# Patient Record
Sex: FEMALE | Race: WHITE | NOT HISPANIC OR LATINO | Employment: FULL TIME | URBAN - METROPOLITAN AREA
[De-identification: names, ages, dates, MRNs, and addresses within clinical notes are randomized per-mention and may not be internally consistent; named-entity substitution may affect disease eponyms.]

---

## 2017-02-01 ENCOUNTER — OFFICE VISIT (OUTPATIENT)
Dept: MEDICAL GROUP | Facility: MEDICAL CENTER | Age: 22
End: 2017-02-01
Payer: COMMERCIAL

## 2017-02-01 VITALS
BODY MASS INDEX: 26.13 KG/M2 | HEART RATE: 87 BPM | TEMPERATURE: 99.1 F | RESPIRATION RATE: 14 BRPM | SYSTOLIC BLOOD PRESSURE: 110 MMHG | DIASTOLIC BLOOD PRESSURE: 82 MMHG | WEIGHT: 176.4 LBS | OXYGEN SATURATION: 98 % | HEIGHT: 69 IN

## 2017-02-01 DIAGNOSIS — M79.2 PERIPHERAL NEUROPATHIC PAIN: ICD-10-CM

## 2017-02-01 DIAGNOSIS — T33.90XS FROSTBITE, SEQUELA: ICD-10-CM

## 2017-02-01 PROCEDURE — 99203 OFFICE O/P NEW LOW 30 MIN: CPT | Performed by: PHYSICIAN ASSISTANT

## 2017-02-01 RX ORDER — GABAPENTIN 300 MG/1
300 CAPSULE ORAL 3 TIMES DAILY
Qty: 90 CAP | Refills: 0 | Status: SHIPPED | OUTPATIENT
Start: 2017-02-01 | End: 2018-04-30

## 2017-02-01 ASSESSMENT — PAIN SCALES - GENERAL: PAINLEVEL: 6=MODERATE PAIN

## 2017-02-01 ASSESSMENT — PATIENT HEALTH QUESTIONNAIRE - PHQ9: CLINICAL INTERPRETATION OF PHQ2 SCORE: 0

## 2017-02-01 NOTE — MR AVS SNAPSHOT
"        Jimmy Gomez   2017 1:20 PM   Office Visit   MRN: [id number]    Department:  [facility]   Dept Phone:  [phone]    Description:  Female : 1995   Provider:  She Dyer PA-C           Reason for Visit     Establish Care Quijano bite (possible nerve damage - both feet)      Allergies as of 2017     Allergen Noted Reactions    Sulfa Drugs 2017   Swelling    Inflammation       You were diagnosed with     Frostbite, initial encounter   [835806]         Vital Signs     Blood Pressure Pulse Temperature Respirations Height Weight    110/82 mmHg 87 37.3 °C (99.1 °F) 14 1.753 m (5' 9.02\") 80.015 kg (176 lb 6.4 oz)    Body Mass Index Oxygen Saturation Last Menstrual Period Breastfeeding? Smoking Status       26.04 kg/m2 98% 2017 No Never Smoker        Basic Information     Date Of Birth Sex Race Ethnicity Preferred Language    1995 Female White Non- English      Your appointments     Mar 02, 2017  9:20 AM   Established Patient with She Dyer PA-C   [facility] (--)    [address]  [address]  [facility] [address]   [phone]           You will be receiving a confirmation call a few days before your appointment from our automated call confirmation system.              Health Maintenance     Patient has no pending health maintenance at this time      Current Immunizations     No immunizations on file.      Below and/or attached are the medications your provider expects you to take. Review all of your home medications and newly ordered medications with your provider and/or pharmacist. Follow medication instructions as directed by your provider and/or pharmacist. Please keep your medication list with you and share with your provider. Update the information when medications are discontinued, doses are changed, or new medications (including over-the-counter products) are added; and carry medication information at all times in the event of " emergency situations     Allergies:  SULFA DRUGS - Swelling               Medications  Valid as of: February 01, 2017 -  1:49 PM    Generic Name Brand Name Tablet Size Instructions for use    Gabapentin (Cap) NEURONTIN 300 MG Take 1 Cap by mouth 3 times a day.        .                 Medicines prescribed today were sent to:     None      Medication refill instructions:       If your prescription bottle indicates you have medication refills left, it is not necessary to call your provider’s office. Please contact your pharmacy and they will refill your medication.    If your prescription bottle indicates you do not have any refills left, you may request refills at any time through one of the following ways: The online ASSURED INFORMATION SECURITY system (except Urgent Care), by calling your provider’s office, or by asking your pharmacy to contact your provider’s office with a refill request. Medication refills are processed only during regular business hours and may not be available until the next business day. Your provider may request additional information or to have a follow-up visit with you prior to refilling your medication.   *Please Note: Medication refills are assigned a new Rx number when refilled electronically. Your pharmacy may indicate that no refills were authorized even though a new prescription for the same medication is available at the pharmacy. Please request the medicine by name with the pharmacy before contacting your provider for a refill.        Referral     A referral request has been sent to our patient care coordination department. Please allow 3-5 business days for us to process this request and contact you either by phone or mail. If you do not hear from us by the 5th business day, please call us at [phone].           ASSURED INFORMATION SECURITY Access Code: Activation code not generated  Current ASSURED INFORMATION SECURITY Status: Active

## 2017-02-01 NOTE — PROGRESS NOTES
"Subjective:      Jimmy Dyer is a 21 y.o. female who presents with Establish [facility]          Chief Complaint   Patient presents with   • Establish Care     Quijano bite (possible nerve damage - both feet)       HPIPatient presents to establish care to follow up on bilateral foot frostbite. 2 weeks ago, Thursday, shoveling snow for 2 hours. Came in and feet were numb and discolored. For 2 days the numbness continued but the color got better. On Sunday pain became severe and she went to the ER in [facility] and was dx with frostbite and put on morphine. Since then color is ok but she still has 6-9/10 pain. When she tried to go back to work 2 days ago she had to leave after 3 hours of standing because of the pain. Has not had this problem before.    ROSno fever/chills. No weight change. No neck or back pain. No skin rash or lesion. No hx of Raynauds or other circulatory problems.    PMHX: negative for heart or lung disease. Negative for diabetes or immune disorder  Meds: on no regular daily meds. Put on norco for frostbite pain, not taking now  Allergy to sulfa  Non-smoker. 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 No     Physical Exam   Constitutional: She is oriented to person, place, and time. She appears well-developed and well-nourished. No distress.   Musculoskeletal:   bilat feet normal appearance, normal color. Cold. Normal cap refill. Normal sensation. Dorsal pedis and post tibialis pulses appreciated bilat    Neurological: She is alert and oriented to person, place, and time.   Skin: Skin is warm and dry. No rash noted. She is not diaphoretic. No pallor.   Psychiatric: She has a normal mood and affect. Her behavior is normal. Judgment and thought content normal. "   Vitals reviewed.              Assessment/Plan:     1. Frostbite, sequela with peripheral neuropathic pain    - gabapentin (NEURONTIN) 300 MG Cap; Take 1 Cap by mouth 3 times a day.  Dispense: 90 Cap; Refill: 0 - start with one a day then increase to 1 tab BID after 3-4 days. Can take up to 1 tab TID after a week. Discussed potential side effects. Ask patient to call with any concerns or adverse reactions.    - REFERRAL TO NEUROLOGY

## 2017-04-12 ENCOUNTER — OFFICE VISIT (OUTPATIENT)
Dept: NEUROLOGY | Facility: MEDICAL CENTER | Age: 22
End: 2017-04-12
Payer: COMMERCIAL

## 2017-04-12 VITALS
SYSTOLIC BLOOD PRESSURE: 114 MMHG | DIASTOLIC BLOOD PRESSURE: 76 MMHG | BODY MASS INDEX: 26.36 KG/M2 | OXYGEN SATURATION: 98 % | HEIGHT: 69 IN | TEMPERATURE: 98.2 F | RESPIRATION RATE: 16 BRPM | WEIGHT: 178 LBS | HEART RATE: 87 BPM

## 2017-04-12 DIAGNOSIS — R55 SYNCOPE AND COLLAPSE: ICD-10-CM

## 2017-04-12 PROCEDURE — 99204 OFFICE O/P NEW MOD 45 MIN: CPT

## 2017-04-12 NOTE — MR AVS SNAPSHOT
"        Jimmy Gomez   2017 9:40 AM   Office Visit   MRN: [id number]    Department[facility]  [facility]   Dept Phone:  [phone]    Description:  Female : 1995   Provider:  Aleks Woodruff M.D.           Reason for Visit     New Patient syncope and collapse      Allergies as of 2017     Allergen Noted Reactions    Sulfa Drugs 2017   Swelling    Inflammation       You were diagnosed with     Syncope and collapse   [780.2.ICD-9-CM]         Vital Signs     Blood Pressure Pulse Temperature Respirations Height Weight    114/76 mmHg 87 36.8 °C (98.2 °F) 16 1.753 m (5' 9.02\") 80.74 kg (178 lb)    Body Mass Index Oxygen Saturation Smoking Status             26.27 kg/m2 98% Never Smoker          Basic Information     Date Of Birth Sex Race Ethnicity Preferred Language    1995 Female White Non- English      Health Maintenance        Date Due Completion Dates    IMM HEP B VACCINE (1 of 3 - Primary Series) 1996 ---    IMM HEP A VACCINE (1 of 2 - Standard Series) 1997 ---    IMM HPV VACCINE (1 of 3 - Female 3 Dose Series) 2007 ---    IMM VARICELLA (CHICKENPOX) VACCINE (1 of 2 - 2 Dose Adolescent Series) 2009 ---    IMM MENINGOCOCCAL VACCINE (MCV4) (1 of 1) 2012 ---    IMM DTaP/Tdap/Td Vaccine (1 - Tdap) 2015 ---            Current Immunizations     No immunizations on file.      Below and/or attached are the medications your provider expects you to take. Review all of your home medications and newly ordered medications with your provider and/or pharmacist. Follow medication instructions as directed by your provider and/or pharmacist. Please keep your medication list with you and share with your provider. Update the information when medications are discontinued, doses are changed, or new medications (including over-the-counter products) are added; and carry medication information at all times in the event of emergency situations     Allergies:  SULFA DRUGS - " Swelling               Medications  Valid as of: April 12, 2017 -  9:55 AM    Generic Name Brand Name Tablet Size Instructions for use    Gabapentin (Cap) NEURONTIN 300 MG Take 1 Cap by mouth 3 times a day.        .                 Medicines prescribed today were sent to:     [id number] - [place], [place] - [address]    [address] [place] [address]    Phone: [phone] Fax: [phone]    Open 24 Hours?: No      Medication refill instructions:       If your prescription bottle indicates you have medication refills left, it is not necessary to call your provider’s office. Please contact your pharmacy and they will refill your medication.    If your prescription bottle indicates you do not have any refills left, you may request refills at any time through one of the following ways: The online Belly Ballot system (except Urgent Care), by calling your provider’s office, or by asking your pharmacy to contact your provider’s office with a refill request. Medication refills are processed only during regular business hours and may not be available until the next business day. Your provider may request additional information or to have a follow-up visit with you prior to refilling your medication.   *Please Note: Medication refills are assigned a new Rx number when refilled electronically. Your pharmacy may indicate that no refills were authorized even though a new prescription for the same medication is available at the pharmacy. Please request the medicine by name with the pharmacy before contacting your provider for a refill.        Your To Do List     Future Labs/Procedures Complete By Expires    CBC WITH DIFFERENTIAL  As directed 4/12/2018    COMP METABOLIC PANEL  As directed 4/12/2018      Referral     A referral request has been sent to our patient care coordination department. Please allow 3-5 business days for us to process this request and contact you either by phone or mail. If you do not hear  from us by the 5th business day, please call us at [phone].           Fileblaze Access Code: Activation code not generated  Current Fileblaze Status: Active

## 2017-04-12 NOTE — Clinical Note
April 12, 2017       Patient: Jimmy Dyer   YOB: 1995   Date of Visit: 4/12/2017         To Whom It May Concern:    It is my medical opinion that Jimmy Dyer duty restriction- off work):[id number]}. For 2 months    If you have any questions or concerns, please don't hesitate to call [phone]          Sincerely,          Aleks Woodruff M.D.  Electronically Signed

## 2017-04-12 NOTE — PROGRESS NOTES
Neurology Consult Note  4/12/2017      Referring MD:  Dr. She Dyer    Patient ID:  Name:             Jimmy Dyer     YOB: 1995  Age:                 21 y.o.  female   MRN:               [id number]                                              Reason for Consult:      Syncope.    History of Present Illness:    This 21-year-old young woman who has been fairly athletic has had a couple of episodes of syncope or near syncope in the last which resulted in prolonged unresponsiveness for a couple of minutes. She notes that she begins to feel warm and then has some visual changes and has had one or 2 episodes of syncope and at the last event apparently had leaned against the wall and was unconscious until she was laid down flat on the floor. She apparently had a cardiogram done in the emergency several which was normal but was told that she had some eye fluttering and twitching movements until she awakened. She has been an active skier and has done 10 mile run at least one point in her career. She has no other health problems she is aware of and is on no medications. 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 Is no family history of syncope or family history of seizures.    Review of Systems: Essentially negative.  Constitutional: Denies fevers, Denies weight changes  Eyes: Denies changes in vision, no eye pain  Ears/Nose/Throat/Mouth: Denies nasal congestion or sore throat   Cardiovascular: Denies chest pain, Denies palpitations   Respiratory: Denies shortness of breath , Denies cough  Gastrointestinal/Hepatic: Denies abdominal pain, nausea, vomiting, diarrhea, constipation or GI bleeding   Genitourinary: Denies dysuria or frequency  Musculoskeletal/Rheum: Denies  joint pain and swelling, No edema  Skin: Denies  rash  Neurological: Denies headache, confusion, memory loss or focal weakness/parasthesias  Psychiatric: denies mood disorder   Endocrine: Tiffanie thyroid problems  Heme/Oncology/Lymph Nodes: Denies enlarged lymph nodes, denies brusing or known bleeding disorder  All other systems were reviewed and are negative (AMA/CMS criteria)                Past Medical History:     Past Medical History   Diagnosis Date   • Anxiety    • Depression        Problems addressed this visit:    Problem List Items Addressed This Visit     None      Visit Diagnoses     Syncope and collapse         Relevant Orders     REFERRAL TO NEURODIAGNOSTICS (EEG,EP,EMG/NCS/DBS) Modality Requested: EEG     REFERRAL TO CARDIOLOGY     COMP METABOLIC PANEL     CBC WITH DIFFERENTIAL     TSH+FREE T4     POCT A1C           Past Surgical History:   No past surgical history on file.      Current Outpatient Medications:  Current Outpatient Prescriptions   Medication   • gabapentin (NEURONTIN) 300 MG Cap     No current facility-administered medications for this visit.       Medication Allergy:  Allergies   Allergen Reactions   • Sulfa Drugs Swelling     Inflammation        Family History:  Family History   Problem Relation Age of Onset   • No Known Problems Mother    • No Known Problems Father    • No Known Problems Brother        Social History:  Social History     Social History   • Marital Status: Single     Spouse Name: N/A   • Number of Children: N/A   • Years of Education: N/A     Occupational History   • Not on file.     Social History Main Topics   • Smoking status: Never Smoker    • Smokeless tobacco: Never Used   • Alcohol Use: No   • Drug Use: No   • Sexual Activity:     Partners: Male     Birth Control/ Protection: Condom     Other Topics Concern   • Not on file     Social History Narrative   • No narrative on file       Physical Exam: He is a well-developed well-nourished obviously athletic young woman. Visual fields are full and extraocular  "movements are intact and the fundi are normal. Her heart is in regular sinus rhythm. Strengthening of the lower extremities and normal deep tendon reflexes are symmetric and plantar responses are downgoing. Coordination is intact to finger-nose heel-to-shin testing and Romberg. There is no sensory deficit to pinprick or light touch. No bruits are heard.  Vitals:   Filed Vitals:    04/12/17 0922   BP: 114/76   Pulse: 87   Temp: 36.8 °C (98.2 °F)   Resp: 16   Height: 1.753 m (5' 9.02\")   Weight: 80.74 kg (178 lb)   SpO2: 98%     General Appearance:   Weight/BMI: Body mass index is 26.27 kg/(m^2).    Neurologic Exam:   AOx3: Normal  Recent & remote memory intact: Yes  Attentive with normal coordination: Yes  Normal spontaneous speech pattern: Yes  Age appropriate fund of knowledge: Yes  Cranial nerve II intact: Normal  Cranial nerve III, IV & VI intact: Normal  Cranial nerve V intact: Normal  Cranial nerve VIII intact: Normal  Cranial nerve IX intact: Normal  Cranial nerve XI intact: Normal  Cranial nerve XII intact: Normal  No sensory deficits: Normal  DTRs intact & symmetrical: Normal   No dysdiadochokinesia or dysmetria: Normal    Eyes:  Normal optic discs: Yes  Visual field: Normal  Pupillary responses: Normal  Extraocular movement: Normal    Cardiovascular:  Normal carotid pulses bilaterally: Yes  RRR with no MRGs: Yes  No peripheral edema, pulses intact: Yes    Musculoskeletal:  Normal gait and station: Yes  Normal muscle strength: Yes  Normal muscle tone, no atrophy: Yes  No abnormal movements: Yes    Plan:   I suspect this is simply vasovagal syncope but since she did have a prolonged period of unconsciousness and EEG will be arranged. In addition I'm arranging for routine lab work and a cardiology consultation and advised her not to work since she works as a  for 2 months. Follow-up visit will be scheduled after that.    Total length of time of this visit was 40 minutes of which greater than 50% " was spent counseling the patient/family and coordinating care.    Thank you for allowing me to participate in his care.    Sincerely yours,        Aleks Woodruff MD, PhD

## 2017-06-14 ENCOUNTER — APPOINTMENT (OUTPATIENT)
Dept: NEUROLOGY | Facility: MEDICAL CENTER | Age: 22
End: 2017-06-14
Payer: COMMERCIAL

## 2017-06-21 ENCOUNTER — TELEPHONE (OUTPATIENT)
Dept: MEDICAL GROUP | Facility: MEDICAL CENTER | Age: 22
End: 2017-06-21

## 2017-06-21 DIAGNOSIS — R10.32 LLQ PAIN: ICD-10-CM

## 2017-06-21 NOTE — TELEPHONE ENCOUNTER
1. Caller Name: Laela Wilson                                         Call Back Number: [phone] (home)       Patient approves a detailed voicemail message: yes    2. SPECIFIC Action To Be Taken: Orders pending, please sign.    3. Diagnosis/Clinical Reason for Request: Severe abdominal Pain    4. Specialty & Provider Name/Lab/Imaging Location: Ultrasound    5. Is appointment scheduled for requested order/referral: no    Patient informed they will receive a return phone call from the office ONLY if there are any questions before processing their request. Advised to call back if they haven't received a call from the referral department in 5 days.      PATIENT STATES SHE WAS SEEN IN [place] ER. PATIENT STATES SHE WAS SEEN FOR SEVERE ABDOMINAL PAIN/LEFT OVARY PAIN. ER TOLD PATIENT THERES A POSSIBLE CYST AND HER OVARIES MAY BE TURNING. PATIENT STATES SHE IS IN EXCRUCIATING PAIN. THEY GAVE HER NORCO AND STILL ISN'T TOUCHING THE PAIN PLEASE ADVISE

## 2017-06-22 ENCOUNTER — HOSPITAL ENCOUNTER (EMERGENCY)
Facility: MEDICAL CENTER | Age: 22
End: 2017-06-23
Attending: EMERGENCY MEDICINE
Payer: COMMERCIAL

## 2017-06-22 ENCOUNTER — HOSPITAL ENCOUNTER (OUTPATIENT)
Dept: RADIOLOGY | Facility: MEDICAL CENTER | Age: 22
End: 2017-06-22
Attending: PHYSICIAN ASSISTANT
Payer: COMMERCIAL

## 2017-06-22 VITALS
DIASTOLIC BLOOD PRESSURE: 80 MMHG | HEIGHT: 69 IN | HEART RATE: 77 BPM | WEIGHT: 177.25 LBS | RESPIRATION RATE: 16 BRPM | OXYGEN SATURATION: 97 % | TEMPERATURE: 98.1 F | BODY MASS INDEX: 26.25 KG/M2 | SYSTOLIC BLOOD PRESSURE: 124 MMHG

## 2017-06-22 DIAGNOSIS — N83.209 HEMORRHAGIC OVARIAN CYST: ICD-10-CM

## 2017-06-22 DIAGNOSIS — R10.32 LLQ PAIN: ICD-10-CM

## 2017-06-22 DIAGNOSIS — R10.32 LEFT LOWER QUADRANT PAIN: ICD-10-CM

## 2017-06-22 LAB
ALBUMIN SERPL BCP-MCNC: 3.9 G/DL (ref 3.2–4.9)
ALBUMIN/GLOB SERPL: 1.5 G/DL
ALP SERPL-CCNC: 40 U/L (ref 30–99)
ALT SERPL-CCNC: 7 U/L (ref 2–50)
ANION GAP SERPL CALC-SCNC: 6 MMOL/L (ref 0–11.9)
AST SERPL-CCNC: 14 U/L (ref 12–45)
BASOPHILS # BLD AUTO: 0.5 % (ref 0–1.8)
BASOPHILS # BLD: 0.02 K/UL (ref 0–0.12)
BILIRUB SERPL-MCNC: 0.4 MG/DL (ref 0.1–1.5)
BUN SERPL-MCNC: 5 MG/DL (ref 8–22)
CALCIUM SERPL-MCNC: 9.1 MG/DL (ref 8.5–10.5)
CHLORIDE SERPL-SCNC: 110 MMOL/L (ref 96–112)
CO2 SERPL-SCNC: 25 MMOL/L (ref 20–33)
CREAT SERPL-MCNC: 0.8 MG/DL (ref 0.5–1.4)
EOSINOPHIL # BLD AUTO: 0.25 K/UL (ref 0–0.51)
EOSINOPHIL NFR BLD: 5.6 % (ref 0–6.9)
ERYTHROCYTE [DISTWIDTH] IN BLOOD BY AUTOMATED COUNT: 45.8 FL (ref 35.9–50)
GFR SERPL CREATININE-BSD FRML MDRD: >60 ML/MIN/1.73 M 2
GLOBULIN SER CALC-MCNC: 2.6 G/DL (ref 1.9–3.5)
GLUCOSE SERPL-MCNC: 94 MG/DL (ref 65–99)
HCG SERPL QL: NEGATIVE
HCT VFR BLD AUTO: 39 % (ref 37–47)
HGB BLD-MCNC: 12.5 G/DL (ref 12–16)
IMM GRANULOCYTES # BLD AUTO: 0.01 K/UL (ref 0–0.11)
IMM GRANULOCYTES NFR BLD AUTO: 0.2 % (ref 0–0.9)
LIPASE SERPL-CCNC: 8 U/L (ref 11–82)
LYMPHOCYTES # BLD AUTO: 1.97 K/UL (ref 1–4.8)
LYMPHOCYTES NFR BLD: 44.5 % (ref 22–41)
MCH RBC QN AUTO: 28.2 PG (ref 27–33)
MCHC RBC AUTO-ENTMCNC: 32.1 G/DL (ref 33.6–35)
MCV RBC AUTO: 88 FL (ref 81.4–97.8)
MONOCYTES # BLD AUTO: 0.41 K/UL (ref 0–0.85)
MONOCYTES NFR BLD AUTO: 9.3 % (ref 0–13.4)
NEUTROPHILS # BLD AUTO: 1.77 K/UL (ref 2–7.15)
NEUTROPHILS NFR BLD: 39.9 % (ref 44–72)
NRBC # BLD AUTO: 0 K/UL
NRBC BLD AUTO-RTO: 0 /100 WBC
PLATELET # BLD AUTO: 266 K/UL (ref 164–446)
PMV BLD AUTO: 10.2 FL (ref 9–12.9)
POTASSIUM SERPL-SCNC: 3.5 MMOL/L (ref 3.6–5.5)
PROT SERPL-MCNC: 6.5 G/DL (ref 6–8.2)
RBC # BLD AUTO: 4.43 M/UL (ref 4.2–5.4)
SODIUM SERPL-SCNC: 141 MMOL/L (ref 135–145)
WBC # BLD AUTO: 4.4 K/UL (ref 4.8–10.8)

## 2017-06-22 PROCEDURE — 96372 THER/PROPH/DIAG INJ SC/IM: CPT

## 2017-06-22 PROCEDURE — 700111 HCHG RX REV CODE 636 W/ 250 OVERRIDE (IP): Performed by: EMERGENCY MEDICINE

## 2017-06-22 PROCEDURE — 76830 TRANSVAGINAL US NON-OB: CPT

## 2017-06-22 PROCEDURE — 83690 ASSAY OF LIPASE: CPT

## 2017-06-22 PROCEDURE — 85025 COMPLETE CBC W/AUTO DIFF WBC: CPT

## 2017-06-22 PROCEDURE — 80053 COMPREHEN METABOLIC PANEL: CPT

## 2017-06-22 PROCEDURE — 84703 CHORIONIC GONADOTROPIN ASSAY: CPT

## 2017-06-22 PROCEDURE — 36415 COLL VENOUS BLD VENIPUNCTURE: CPT

## 2017-06-22 PROCEDURE — 99284 EMERGENCY DEPT VISIT MOD MDM: CPT

## 2017-06-22 RX ORDER — ONDANSETRON 8 MG/1
8 TABLET, ORALLY DISINTEGRATING ORAL EVERY 8 HOURS PRN
Qty: 15 TAB | Refills: 0 | Status: SHIPPED | OUTPATIENT
Start: 2017-06-22 | End: 2018-04-30

## 2017-06-22 RX ORDER — OXYCODONE AND ACETAMINOPHEN 7.5; 325 MG/1; MG/1
1 TABLET ORAL EVERY 4 HOURS PRN
Qty: 30 TAB | Refills: 0 | Status: SHIPPED | OUTPATIENT
Start: 2017-06-22 | End: 2018-04-30

## 2017-06-22 RX ORDER — ONDANSETRON 4 MG/1
4 TABLET, ORALLY DISINTEGRATING ORAL ONCE
Status: COMPLETED | OUTPATIENT
Start: 2017-06-22 | End: 2017-06-22

## 2017-06-22 RX ORDER — DOCUSATE SODIUM 100 MG/1
100 CAPSULE, LIQUID FILLED ORAL 2 TIMES DAILY
Qty: 30 CAP | Refills: 0 | Status: SHIPPED | OUTPATIENT
Start: 2017-06-22 | End: 2018-04-30

## 2017-06-22 RX ORDER — NORGESTIMATE AND ETHINYL ESTRADIOL 0.25-0.035
1 KIT ORAL DAILY
Qty: 28 TAB | Refills: 2 | Status: SHIPPED | OUTPATIENT
Start: 2017-06-22 | End: 2017-11-17 | Stop reason: SDUPTHER

## 2017-06-22 RX ADMIN — ONDANSETRON 4 MG: 4 TABLET, ORALLY DISINTEGRATING ORAL at 23:38

## 2017-06-22 RX ADMIN — HYDROMORPHONE HYDROCHLORIDE 1 MG: 1 INJECTION, SOLUTION INTRAMUSCULAR; INTRAVENOUS; SUBCUTANEOUS at 23:38

## 2017-06-22 ASSESSMENT — PAIN SCALES - GENERAL: PAINLEVEL_OUTOF10: 8

## 2017-06-22 ASSESSMENT — LIFESTYLE VARIABLES: DO YOU DRINK ALCOHOL: NO

## 2017-06-22 NOTE — ED AVS SNAPSHOT
Voalte Access Code: Activation code not generated  Current Voalte Status: Active    [employer]ikServehart  A secure, online tool to manage your health information     [employer]’s Voalte® is a secure, online tool that connects you to your personalized health information from the privacy of your home -- day or night - making it very easy for you to manage your healthcare. Once the activation process is completed, you can even access your medical information using the Voalte tony, which is available for free in the Apple Tony store or Google Play store.     Voalte provides the following levels of access (as shown below):   My Chart Features   [facility] Primary Care Doctor [facility]  Specialists [facility]  Urgent  Care Non-[facility]  Primary Care  Doctor   Email your healthcare team securely and privately 24/7 X X X X   Manage appointments: schedule your next appointment; view details of past/upcoming appointments X      Request prescription refills. X      View recent personal medical records, including lab and immunizations X X X X   View health record, including health history, allergies, medications X X X X   Read reports about your outpatient visits, procedures, consult and ER notes X X X X   See your discharge summary, which is a recap of your hospital and/or ER visit that includes your diagnosis, lab results, and care plan. X X       How to register for Voalte:  1. Go to  [email or web address].  2. Click on the Sign Up Now box, which takes you to the New Member Sign Up page. You will need to provide the following information:  a. Enter your Voalte Access Code exactly as it appears at the top of this page. (You will not need to use this code after you’ve completed the sign-up process. If you do not sign up before the expiration date, you must request a new code.)   b. Enter your date of birth.   c. Enter your home email address.   d. Click Submit, and follow the next screen’s instructions.  3. Create a Voalte ID. This will  be your [employer] login ID and cannot be changed, so think of one that is secure and easy to remember.  4. Create a [employer] password. You can change your password at any time.  5. Enter your Password Reset Question and Answer. This can be used at a later time if you forget your password.   6. Enter your e-mail address. This allows you to receive e-mail notifications when new information is available in The Editorialist.  7. Click Sign Up. You can now view your health information.    For assistance activating your [employer]ist account, call [phone]

## 2017-06-22 NOTE — ED AVS SNAPSHOT
6/22/2017    Jimmy Dyer   [address]  [facility] [address]    Dear Jimmy:    [facility] wants to ensure your discharge home is safe and you or your loved ones have had all of your questions answered regarding your care after you leave the hospital.    Below is a list of resources and contact information should you have any questions regarding your hospital stay, follow-up instructions, or active medical symptoms.    Questions or Concerns Regarding… Contact   Medical Questions Related to Your Discharge  (7 days a week, 8am-5pm) Contact a Nurse Care Coordinator   [phone]   Medical Questions Not Related to Your Discharge  (24 hours a day / 7 days a week)  Contact the Nurse Health Line   [phone]    Medications or Discharge Instructions Refer to your discharge packet   or contact your [facility] Primary Care Provider   [phone]   Follow-up Appointment(s) Schedule your appointment via [employer]   or contact Scheduling [phone]   Billing Review your statement via [employer]  or contact Billing [phone]   Medical Records Review your records via [employer]   or contact Medical Records [phone]     You may receive a telephone call within two days of discharge. This call is to make certain you understand your discharge instructions and have the opportunity to have any questions answered. You can also easily access your medical information, test results and upcoming appointments via the [employer] free online health management tool. You can learn more and sign up at [employer]. For assistance setting up your [employer] account, please call [phone].    Once again, we want to ensure your discharge home is safe and that you have a clear understanding of any next steps in your care. If you have any questions or concerns, please do not hesitate to contact us, we are here for you. Thank you for choosing [facility] for your healthcare needs.    Sincerely,    Your [facility] Healthcare Team

## 2017-06-22 NOTE — ED AVS SNAPSHOT
Home Care Instructions                                                                                                                Jimmy Dyer   MRN: [id number]    Department:  [facility], Emergency Dept   Date of Visit:  6/22/2017            [facility], Emergency Dept    [address]    [place] [address]    Phone:  [phone]      You were seen by     Jarett Vanegas M.D.      Your Diagnosis Was     Hemorrhagic ovarian cyst     N83.209       These are the medications you received during your hospitalization from 06/22/2017 1637 to 06/22/2017 2349     Date/Time Order Dose Route Action    06/22/2017 2338 HYDROmorphone (DILAUDID) injection 1 mg 1 mg Intramuscular Given    06/22/2017 2338 ondansetron (ZOFRAN ODT) dispertab 4 mg 4 mg Oral Given      Follow-up Information     1. Schedule an appointment as soon as possible for a visit with Chicho Garner M.D..    Specialty:  OB/Gyn    Contact information    [address]  [facility] [address]  [phone]        Medication Information     Review all of your home medications and newly ordered medications with your primary doctor and/or pharmacist as soon as possible. Follow medication instructions as directed by your doctor and/or pharmacist.     Please keep your complete medication list with you and share with your physician. Update the information when medications are discontinued, doses are changed, or new medications (including over-the-counter products) are added; and carry medication information at all times in the event of emergency situations.               Medication List      START taking these medications        Instructions    Morning Afternoon Evening Bedtime    docusate sodium 100 MG Caps   Commonly known as:  COLACE        Take 1 Cap by mouth 2 times a day.   Dose:  100 mg                        norgestimate-ethinyl estradiol 0.25-35 MG-MCG per tablet   Commonly known as:  ORTHO-CYCLEN        Take 1  Tab by mouth every day.   Dose:  1 Tab                        oxycodone-acetaminophen 7.5-325 MG per tablet   Commonly known as:  PERCOCET        Take 1 Tab by mouth every four hours as needed.   Dose:  1 Tab                          ASK your doctor about these medications        Instructions    Morning Afternoon Evening Bedtime    gabapentin 300 MG Caps   Commonly known as:  NEURONTIN        Take 1 Cap by mouth 3 times a day.   Dose:  300 mg                             Where to Get Your Medications      These medications were sent to [id number] - [place], [place] - [address]  [address] [place] [address]     Phone:  [phone]    - docusate sodium 100 MG Caps  - norgestimate-ethinyl estradiol 0.25-35 MG-MCG per tablet      You can get these medications from any pharmacy     Bring a paper prescription for each of these medications    - oxycodone-acetaminophen 7.5-325 MG per tablet            Procedures and tests performed during your visit     BETA-HCG QUALITATIVE SERUM    CARDIAC MONITORING    CBC WITH DIFFERENTIAL    COMP METABOLIC PANEL    ESTIMATED GFR    LIPASE    O2 Protocol    SALINE LOCK            Patient Information     Patient Information    Following emergency treatment: all patient requiring follow-up care must return either to a private physician or a clinic if your condition worsens before you are able to obtain further medical attention, please return to the emergency room.     Billing Information    At [facility], we work to make the billing process streamlined for our patients.  Our Representatives are here to answer any questions you may have regarding your hospital bill.  If you have insurance coverage and have supplied your insurance information to us, we will submit a claim to your insurer on your behalf.  Should you have any questions regarding your bill, we can be reached online or by phone as follows:  Online: You are able pay your bills online or live chat with  our representatives about any billing questions you may have. We are here to help Monday - Friday from 8:00am to 7:30pm and 9:00am - 12:00pm on Saturdays.  Please visit [email or web address]  for more information.   Phone:  [phone] or [phone]    Please note that your emergency physician, surgeon, pathologist, radiologist, anesthesiologist, and other specialists are not employed by [facility] and will therefore bill separately for their services.  Please contact them directly for any questions concerning their bills at the numbers below:     Emergency Physician Services:  [phone]  [facility]:  [phone]  [facility]:  [phone]  [facility]:  [phone]    1. Your final bill may vary from the amount quoted upon discharge if all procedures are not complete at that time, or if your doctor has additional procedures of which we are not aware. You will receive an additional bill if you return to the Emergency Department at [facility] for suture removal regardless of the facility of which the sutures were placed.     2. Please arrange for settlement of this account at the emergency registration.    3. All self-pay accounts are due in full at the time of treatment.  If you are unable to meet this obligation then payment is expected within 4-5 days.     4. If you have had radiology studies (CT, X-ray, Ultrasound, MRI), you have received a preliminary result during your emergency department visit. Please contact the radiology department [phone] to receive a copy of your final result. Please discuss the Final result with your primary physician or with the follow up physician provided.     Crisis Hotline:  [place] Crisis Hotline:  7-243-TPZLAQT or 1-950.368.2911  [place] Crisis Hotline:    1-365.364.6003 or 962-215-7618         ED Discharge Follow Up Questions    1. In order to provide you with very good care, we  would like to follow up with a phone call in the next few days.  May we have your permission to contact you?     YES /  NO    2. What is the best phone number to call you? (       )_____-__________    3. What is the best time to call you?      Morning  /  Afternoon  /  Evening                   Patient Signature:  ____________________________________________________________    Date:  ____________________________________________________________      Your appointments     Jun 23, 2017  4:00 PM   Established Patient with Divina Roca M.D.   [facility] (--)    [address] [address] [address]   [phone]           You will be receiving a confirmation call a few days before your appointment from our automated call confirmation system.

## 2017-06-22 NOTE — ED NOTES
Pt ambulates to triage  Chief Complaint   Patient presents with   • Pelvic Pain   • Abdominal Pain   pt reports seen at [facility] Tuesday night, and had US today  Pt denies vaginal bleeding, pt reports nausea   Pt asked to wait in lobby, pt updated on triage process and pt asked to inform RN of any changes.

## 2017-06-23 NOTE — ED NOTES
Pt resting comfortably on [place].   Call light within reach and visible from nurses station.

## 2017-06-23 NOTE — ED PROVIDER NOTES
ED Provider Note    Scribed for Jarett Vanegas M.D. by Michelle Manuel. 6/22/2017, 10:51 PM.    Primary care provider: She Dyer PA-C  Means of arrival: Walk-in   History obtained from: Patient  History limited by: None     CHIEF COMPLAINT  Chief Complaint   Patient presents with   • Pelvic Pain   • Abdominal Pain       HPI  Jimmy Dyer is a 21 y.o. female who presents to the Emergency Department for waxing and waning suprapubic/pelvic pain greater on the left onset a few days ago with associated intermittent nausea. She states that this began as a stomach virus at which time an ovarian cyst was identified. The patient's vomiting and fever have resolved. Per patient, she is able to get her pain down to a 5/10 if she takes Norco regularly. The patient reports that she was seen in the ER in [facility] 2 days ago where she had a CT scan and US. She is unsure what the US results are. Patient denies chills, diarrhea, vaginal discharge, abnormal vaginal bleeding, dysuria, hematuria, lightheadedness. Her LMP began on 6/10/17. She has been taking oral contraceptives, Seasonique, for the past 5 years. The patient does not have a gynecologist.     REVIEW OF SYSTEMS  See HPI,  Negative for chills, diarrhea, vaginal discharge, abnormal vaginal bleeding, dysuria, hematuria, lightheadedness, vomiting, fever. Remainder of ROS negative. C    PAST MEDICAL HISTORY   has a past medical history of Anxiety and Depression.    SURGICAL HISTORY  patient denies any surgical history    SOCIAL HISTORY  Social History   Substance Use Topics   • Smoking status: Never Smoker    • Smokeless tobacco: Never Used   • Alcohol Use: No      History   Drug Use No       FAMILY HISTORY  Family History   Problem Relation Age of Onset   • No Known Problems Mother    • No Known Problems Father    • No Known Problems Brother        CURRENT MEDICATIONS  Reviewed.  See Encounter Summary.     ALLERGIES  Allergies   Allergen Reactions   • Sulfa Drugs  "Swelling     Inflammation        PHYSICAL EXAM  VITAL SIGNS: /86 mmHg  Pulse 72  Temp(Src) 36.4 °C (97.5 °F)  Resp 18  Ht 1.753 m (5' 9.02\")  Wt 80.4 kg (177 lb 4 oz)  BMI 26.16 kg/m2  SpO2 97%    Constitutional: Awake, alert in no apparent distress.  HENT: Normocephalic, Bilateral external ears normal. Nose normal.   Eyes: Conjunctiva normal, non-icteric, EOMI.    Thorax & Lungs: Easy unlabored respirations, Clear to ascultation bilaterally.  Cardiovascular: Regular rate, Regular rhythm, No murmurs, rubs or gallops.  Abdomen:  Soft, diffuse pelvic tenderness, no rebound or guarding. Negative psoas, negative obturator, negative Rovsing's, negative Gold's.  Skin: Visualized skin is dry, No erythema, No rash.   Extremities:   No cyanosis, clubbing or edema.  Neurologic: Alert, Grossly non-focal.   Psychiatric: Normal affect, Normal mood      COURSE & MEDICAL DECISION MAKING  Pertinent Labs & Imaging studies reviewed. (See chart for details)    10:51 PM - Patient seen and examined at bedside. Patient will be treated with 1 mg Dilaudid IV and 4 mg Zofran IV. I reviewed the patient's US with her. She has a relatively large ovarian cyst without torsion. Treatment for ovarian cysts is pain control and hormonal regulation. I can prescribe a different oral contraceptive. She should refrain from sexual intercourse. Discussed plan for discharge; I advised the patient to return to the [facility] ED with any new or worsening symptoms. Patient was given the opportunity for questions. I addressed all questions or concerns at this time and they verbalize agreement to the plan of care.     I reviewed the patient's controlled substance history on the [place] Prescription Monitoring Program which reveals one previous prescription.     The patient is referred to a primary physician for blood pressure management, diabetic screening, and for all other preventative health concerns.     Decision Making:  This is a 21 y.o. year " old female who presents with diffuse pelvic pain for the past 72 hours. She had an outpatient ultrasound today that reveals a moderately sized left ovarian cyst with moderate amount of free fluid concerning for hemorrhage. The patient is hemodynamically stable, she is not tachycardic, she is not anemic. She does not have any peritoneal signs. At this time I do not suspect active hemorrhage. The pain appears to be related to a 5 cm ovarian cyst. I did explain that this will likely involute without treatment. She does not have any signs of torsion on ultrasound.    I did explain that she should return immediately for any sudden onset of severe localized abdominal pain, intractable vomiting, dizziness or syncope. Otherwise she should follow-up with her gynecologist. I did give her a number for follow-up. She'll be given additional pain medications and antiemetics.    I have looked the patient up in the prescription monitoring database prior to prescribing the scheduled medication. I have also counseled the patient not to drive or operate heavy machinery while taking any medication that may cause sedation.      DISPOSITION:  Patient will be discharged home in good condition.    Discharge Medications:  New Prescriptions    DOCUSATE SODIUM (COLACE) 100 MG CAP    Take 1 Cap by mouth 2 times a day.    NORGESTIMATE-ETHINYL ESTRADIOL (ORTHO-CYCLEN) 0.25-35 MG-MCG PER TABLET    Take 1 Tab by mouth every day.    ONDANSETRON (ZOFRAN ODT) 8 MG TABLET DISPERSIBLE    Take 1 Tab by mouth every 8 hours as needed for Nausea/Vomiting.    OXYCODONE-ACETAMINOPHEN (PERCOCET) 7.5-325 MG PER TABLET    Take 1 Tab by mouth every four hours as needed.     The patient was discharged home (see d/c instructions) and told to return immediately for any signs or symptoms listed, or any worsening at all.  The patient verbally agreed to the discharge precautions and follow-up plan which is documented in EPIC.     FINAL IMPRESSION  1. Hemorrhagic  ovarian cyst       Michelle UNDERWOOD (Scribe), am scribing for, and in the presence of, Jarett Vanegas M.D..    Electronically signed by: Michelle Manuel (Scribe), 6/22/2017    Jarett UNDERWOOD M.D. personally performed the services described in this documentation, as scribed by Michelle Manuel in my presence, and it is both accurate and complete.    The note accurately reflects work and decisions made by me.  Jarett Vanegas  6/23/2017  2:35 AM

## 2017-06-23 NOTE — ED NOTES
Blood collected and sent to lab.  Pt reports she had a CT done at [facility] showing bilateral ovarian cysts.

## 2017-06-23 NOTE — ED NOTES
Pt ambulatory.  Vital signs stable.   Pt states understanding of discharge instructions and paperwork.   Pt states they will follow up with PCP.   Pt handed prescriptions.   Pt states they have a safe way home.

## 2017-06-27 DIAGNOSIS — N83.209 CYST OF OVARY, UNSPECIFIED LATERALITY: ICD-10-CM

## 2017-06-27 NOTE — PROGRESS NOTES
1. Caller Name: Jimmy                                        Call Back Number: [phone]      Patient approves a detailed voicemail message: yes    2. SPECIFIC Action To Be Taken: Referral pending, please sign.    3. Diagnosis/Clinical Reason for Request: Ovarian cyst    4. Specialty & Provider Name/Lab/Imaging Location: Gyn    5. Is appointment scheduled for requested order/referral: no    Patient informed they will receive a return phone call from the office ONLY if there are any questions before processing their request. Advised to call back if they haven't received a call from the referral department in 5 days.

## 2017-07-06 ENCOUNTER — TELEPHONE (OUTPATIENT)
Dept: MEDICAL GROUP | Facility: MEDICAL CENTER | Age: 22
End: 2017-07-06

## 2017-07-06 NOTE — TELEPHONE ENCOUNTER
Pt called and would like a note to clear her to go back to work. Has been out of work due to ovarian cysts, going back on Monday. Will  the note tomorrow.

## 2017-09-11 ENCOUNTER — HOSPITAL ENCOUNTER (OUTPATIENT)
Facility: MEDICAL CENTER | Age: 22
End: 2017-09-11
Attending: OBSTETRICS & GYNECOLOGY
Payer: COMMERCIAL

## 2017-09-11 ENCOUNTER — GYNECOLOGY VISIT (OUTPATIENT)
Dept: OBGYN | Facility: CLINIC | Age: 22
End: 2017-09-11
Payer: COMMERCIAL

## 2017-09-11 VITALS
BODY MASS INDEX: 26.96 KG/M2 | DIASTOLIC BLOOD PRESSURE: 70 MMHG | SYSTOLIC BLOOD PRESSURE: 112 MMHG | HEIGHT: 69 IN | WEIGHT: 182 LBS

## 2017-09-11 DIAGNOSIS — R10.2 PELVIC PAIN: ICD-10-CM

## 2017-09-11 DIAGNOSIS — N83.202 CYST OF LEFT OVARY: ICD-10-CM

## 2017-09-11 LAB
INT CON NEG: NEGATIVE
INT CON POS: POSITIVE
POC URINE PREGNANCY TEST: POSITIVE

## 2017-09-11 PROCEDURE — 87591 N.GONORRHOEAE DNA AMP PROB: CPT

## 2017-09-11 PROCEDURE — 76830 TRANSVAGINAL US NON-OB: CPT | Performed by: OBSTETRICS & GYNECOLOGY

## 2017-09-11 PROCEDURE — 87491 CHLMYD TRACH DNA AMP PROBE: CPT

## 2017-09-11 PROCEDURE — 81025 URINE PREGNANCY TEST: CPT | Performed by: OBSTETRICS & GYNECOLOGY

## 2017-09-11 PROCEDURE — 99203 OFFICE O/P NEW LOW 30 MIN: CPT | Mod: 25 | Performed by: OBSTETRICS & GYNECOLOGY

## 2017-09-11 NOTE — PROGRESS NOTES
Chief complaint;  This patient is a 21 y.o. female , Patient's last menstrual period was 2017. presents complaining of left lower quadrant pain which started 2-3 months ago. The patient is sharp in nature and can lastly wear for a few minutes to several hours. The patient has been on narcotic pain analgesia. The patient ultrasound in the emergency department 2017 which shows a cyst measuring 5 cm in largest diameter and several small cysts moderate free pelvic fluid. Patient has some mild discomfort during intercourse but she still sexually active but decreased the frequency of intercourse. Narcotic analgesia does help with the pain. Patient is currently using Seasonique for birth control.    Review of systems-denies; chest pain, shortness of breath, fever, chills, abdominal pain  Past OB history-  OB History    Para Term  AB Living   0 0 0 0 0 0   SAB TAB Ectopic Molar Multiple Live Births   0 0 0 0 0 0           Past surgical history- No past surgical history on file.  Past medical history-   Past Medical History:   Diagnosis Date   • Anxiety    • Depression      Allergies- Sulfa drugs  Present medications-   Outpatient Encounter Prescriptions as of 2017   Medication Sig Dispense Refill   • oxycodone-acetaminophen (PERCOCET) 7.5-325 MG per tablet Take 1 Tab by mouth every four hours as needed. 30 Tab 0   • docusate sodium (COLACE) 100 MG Cap Take 1 Cap by mouth 2 times a day. 30 Cap 0   • norgestimate-ethinyl estradiol (ORTHO-CYCLEN) 0.25-35 MG-MCG per tablet Take 1 Tab by mouth every day. 28 Tab 2   • ondansetron (ZOFRAN ODT) 8 MG TABLET DISPERSIBLE Take 1 Tab by mouth every 8 hours as needed for Nausea/Vomiting. 15 Tab 0   • gabapentin (NEURONTIN) 300 MG Cap Take 1 Cap by mouth 3 times a day. 90 Cap 0     No facility-administered encounter medications on file as of 2017.      Family history- no history of breast or ovarian cancer  Social history-   Social History  "    Social History   • Marital status: Single     Spouse name: N/A   • Number of children: N/A   • Years of education: N/A     Occupational History   • Not on file.     Social History Main Topics   • Smoking status: Never Smoker   • Smokeless tobacco: Never Used   • Alcohol use No   • Drug use: No   • Sexual activity: Yes     Partners: Male     Birth control/ protection: Pill     Other Topics Concern   • Not on file     Social History Narrative   • No narrative on file         Physical examination;   Alert and oriented x3  General-well-developed well-nourished female in no apparent distress  Vitals:    09/11/17 0928   BP: 112/70   Weight: 82.6 kg (182 lb)   Height: 1.753 m (5' 9\")     Skin is warm and dry   HEENT-normocephalic,nontraumatic,PERRLA,EOMI  Throat- no edema or erythema  Neck-supple  Cardiovascular-regular rate and rhythm, normal S1-S2 without murmurs or gallops  Lungs-clear to auscultation bilaterally  Back-negative CVA tenderness  Abdomen-nondistended positive bowel sounds soft nontender without masses or hepatosplenomegaly  Pelvic examination;  External genitalia-without lesions  Vagina-no blood, no discharge  Cervix-closed,no gross lesions  Uterus-Normal size, nontender  Adnexa- without mass or tenderness  Extremities-without cyanosis clubbing or edema  Neurologic-grossly intact    Transvaginal ultrasound; as performed and read by myself; uterus measures 5.2 cm x 5.6 cm with very small intrauterine gestational sac which is ill-defined less than 1 cm diameter. There is no free pelvic fluid. The right ovary measures 2.8 cm x 2.0 cm with very small follicles.-Normal-appearing right ovary. Left ovary measures 4 cm in diameter with a 4.4 cm simple cyst and a smaller 1.5 cm follicle/cyst    Urine hCG-positive    Impression;  Chronic pelvic pain  Simple ovarian cyst  Probable early pregnancy      Plan;   Discussed recommending treatment for simple cysts of the ovary is observation and oral " contraceptives.  Patient is pregnant by urine hCG-Patient counseled to stop OCPs immediately  Patient would like termination-given referral  Patient will follow up with me after  Discussed possible laparoscopy after due to ovarian cysts all questions answered would recommend conservative treatment without laparoscopy at the present time patient will restart OCPs after termination-she is okay with this plan of care. Many questions regarding pelvic pain simple ovarian cysts.    35 Minutes total spent with the patient,5 minutes of total time utilized to perform  Ultrasound, greater than 50% of the time has been spent on counseling and coordination of care. Many questions answered regarding possible miscarriage.

## 2017-09-12 LAB
C TRACH DNA SPEC QL NAA+PROBE: NEGATIVE
N GONORRHOEA DNA SPEC QL NAA+PROBE: NEGATIVE
SPECIMEN SOURCE: NORMAL

## 2017-10-01 ENCOUNTER — APPOINTMENT (OUTPATIENT)
Dept: RADIOLOGY | Facility: IMAGING CENTER | Age: 22
End: 2017-10-01
Attending: FAMILY MEDICINE
Payer: COMMERCIAL

## 2017-10-01 ENCOUNTER — OCCUPATIONAL MEDICINE (OUTPATIENT)
Dept: URGENT CARE | Facility: CLINIC | Age: 22
End: 2017-10-01
Payer: COMMERCIAL

## 2017-10-01 VITALS
BODY MASS INDEX: 26.07 KG/M2 | HEIGHT: 69 IN | RESPIRATION RATE: 14 BRPM | TEMPERATURE: 98.1 F | SYSTOLIC BLOOD PRESSURE: 102 MMHG | HEART RATE: 91 BPM | DIASTOLIC BLOOD PRESSURE: 78 MMHG | WEIGHT: 176 LBS | OXYGEN SATURATION: 100 %

## 2017-10-01 DIAGNOSIS — S96.911A STRAIN OF RIGHT FOOT, INITIAL ENCOUNTER: ICD-10-CM

## 2017-10-01 PROCEDURE — 73630 X-RAY EXAM OF FOOT: CPT | Mod: TC,RT | Performed by: FAMILY MEDICINE

## 2017-10-01 PROCEDURE — 73610 X-RAY EXAM OF ANKLE: CPT | Mod: TC,RT | Performed by: FAMILY MEDICINE

## 2017-10-01 PROCEDURE — 99213 OFFICE O/P EST LOW 20 MIN: CPT | Performed by: FAMILY MEDICINE

## 2017-10-01 ASSESSMENT — ENCOUNTER SYMPTOMS
CHILLS: 0
FOCAL WEAKNESS: 0
SENSORY CHANGE: 1
DIZZINESS: 0
FALLS: 1
FEVER: 0

## 2017-10-01 NOTE — PROGRESS NOTES
"Subjective:      Jimmy Dyer is a 22 y.o. female who presents with Other (WC DOI 9/28/17 Right Foot)      DOI 9/28/2017. JOLLY-> Rt foot got twisted between boulders      HPI    Review of Systems   Constitutional: Negative for chills and fever.   Musculoskeletal: Positive for falls and joint pain.   Neurological: Positive for sensory change. Negative for dizziness and focal weakness.          Objective:     /78   Pulse 91   Temp 36.7 °C (98.1 °F)   Resp 14   Ht 1.753 m (5' 9\")   Wt 79.8 kg (176 lb)   SpO2 100%   BMI 25.99 kg/m²      Physical Exam   Constitutional: She appears well-developed. No distress.   HENT:   Head: Normocephalic and atraumatic.   Eyes: Conjunctivae are normal.   Neck: Neck supple.   Cardiovascular: Regular rhythm.    No murmur heard.  Pulmonary/Chest: Effort normal. No respiratory distress.   Musculoskeletal:        Feet:    Neurological: She is alert. She exhibits normal muscle tone.   Skin: Skin is warm and dry.   Psychiatric: She has a normal mood and affect. Judgment normal.   Nursing note and vitals reviewed.  Rt ankle/foot: + edema bruising TTP             Assessment/Plan:         1. Strain of right foot, initial encounter  DX-ANKLE 3+ VIEWS RIGHT    DX-FOOT-COMPLETE 3+ RIGHT     - RICE/OTC motrin  - 1wk recheck  - sedentary duty       Dx & d/c instructions discussed w/ patient and/or family members. Follow up as scheduled, sooner if needed, ER if worse.      Possible side effects (i.e. Rash, GI upset/constipation, sedation, elevation of BP or sugars) of any medications given discussed.               "

## 2017-10-01 NOTE — LETTER
[facility]  [address] - [place] [address]  Phone:  [phone] - Fax:  [phone]   [facility] Progress Report and Disability Certification  Date of Service: 10/1/2017   No Show:  No  Date / Time of Next Visit: 10/9/2017   Claim Information   Patient Name: Jimmy Dyer  Claim Number:     Employer: [employer]    Date of Injury: 9/28/2017     Insurer / TPA: [employer]    ID / SSN:     Occupation:      Diagnosis: The encounter diagnosis was Strain of right foot, initial encounter.    Medical Information   Related to Industrial Injury? Yes      Subjective Complaints:  DOI 9/28/2017. JOLLY-> Rt foot got twisted between boulders    Objective Findings:     Pre-Existing Condition(s):     Assessment:   Initial Visit    Status: Additional Care Required  Permanent Disability:No    Plan:      Diagnostics:      Comments:       Disability Information   Status: Released to Restricted Duty    From:  10/1/2017  Through: 10/9/2017 Restrictions are: Temporary   Physical Restrictions   Sitting:    Standing:    Stooping:    Bending:      Squatting:    Walking:    Climbing:    Pushing:      Pulling:    Other:    Reaching Above Shoulder (L):   Reaching Above Shoulder (R):       Reaching Below Shoulder (L):    Reaching Below Shoulder (R):      Not to exceed Weight Limits   Carrying(hrs):   Weight Limit(lb):   Lifting(hrs):   Weight  Limit(lb):     Comments: Sedentary duty with walking/standing as tolerated     Repetitive Actions   Hands: i.e. Fine Manipulations from Grasping:     Feet: i.e. Operating Foot Controls:     Driving / Operate Machinery:     Physician Name: Carlos Enrique Mcgee M.D. Physician Signature: CARLOS ENRIQUE Mark M.D. e-Signature: Dr. Warren Borja, Medical Director   Clinic Name / Location: [facility]  [address]  [place] [address] Clinic  Phone Number: Dept: [phone]   Appointment Time: 11:00 Am Visit Start Time: 11:50 AM   Check-In Time:  11:04 Am Visit Discharge Time:    12:58 PM   Original-Treating Physician or Chiropractor    Page 2-Insurer/TPA    Page 3-Employer    Page 4-Employee

## 2017-10-01 NOTE — LETTER
"EMPLOYEE’S CLAIM FOR COMPENSATION/ REPORT OF INITIAL TREATMENT  FORM C-4    EMPLOYEE’S CLAIM - PROVIDE ALL INFORMATION REQUESTED   First Name  Jimmy Last Name  Gomez Birthdate                    1995                Sex  female Claim Number   Home Address  [address] Age  22 y.o. Height  1.753 m (5' 9\") Weight  79.8 kg (176 lb) N     [facility] Zip  [address] Telephone  [phone] (home)    Mailing Address  [address] [facility] Zip  [address] Primary Language Spoken  English    Insurer   Third Party   [employer]   Employee's Occupation (Job Title) When Injury or Occupational Disease Occurred       Employer's Name  [employer]  Telephone  [phone]    Employer Address  [address]  Zip  [address]   Date of Injury  9/28/2017               Hour of Injury  1:00 PM Date Employer Notified  9/30/2017 Last Day of Work after Injury or Occupational Disease  9/30/2017 Supervisor to Whom Injury Reported  Jagdeep Mendoza    Address or Location of Accident (if applicable)  [[address] [place] [address],[place] ]   What were you doing at the time of accident? (if applicable)  walking down boulders     How did this injury or occupational disease occur? (Be specific an answer in detail. Use additional sheet if necessary)  I was walking down boulders to feel temp of water , my right foot slipped between two boulders and iI fell into the water with  my foot still stuck    If you believe that you have an occupational disease, when did you first have knowledge of the disability and it relationship to your employment?  n/a Witnesses to the Accident  Renae Conrad       Nature of Injury or Occupational Disease  Fracture  Part(s) of Body Injured or Affected  Foot (R), ,     I certify that the " above is true and correct to the best of my knowledge and that I have provided this information in order to obtain the benefits of [place] or [place]  I hereby authorize any physician, chiropractor, surgeon, practitioner, or other person, any hospital, including [facility], any medical service organization, any insurance company, or other institution or organization to release to each other, any medical or other information, including benefits paid or payable, pertinent to this injury or disease, except information relative to diagnosis, treatment and/or counseling for AIDS, psychological conditions, alcohol or controlled substances, for which I must give specific authorization.  A Photostat of this authorization shall be as valid as the original.     Date   Place   Employee’s Signature   THIS REPORT MUST BE COMPLETED AND MAILED WITHIN 3 WORKING DAYS OF TREATMENT   Place  [facility]  Name of Facility  [facility]   Date  10/1/2017 Diagnosis  (S96.911A) Strain of right foot, initial encounter Is there evidence the injured employee was under the influence of alcohol and/or another controlled substance at the time of accident?   Hour  11:50 AM Description of Injury or Disease  The encounter diagnosis was Strain of right foot, initial encounter. No   Treatment  XR  Have you advised the patient to remain off work five days or more? No   X-Ray Findings  Negative   If Yes   From Date  To Date      From information given by the employee, together with medical evidence, can you directly connect this injury or occupational disease as job incurred?  Yes If No Full Duty  No Modified Duty  Yes   Is additional medical care by a physician indicated?  Yes If Modified Duty, Specify any Limitations / Restrictions  sedentary duty w/ standing/walking as tolerated    Do you know of any previous  "injury or disease contributing to this condition or occupational disease?                            No   Date  10/1/2017 Print Doctor’s Name Carlos Enrique Loja M.D. I certify the employer’s copy of  this form was mailed on:   Address  [address] Insurer’s Use Only     formerly [facility] Zip  [address]    Provider’s Tax ID Number  [id number]  Telephone  Dept: [phone]        e-SignCARLOS ENRIQUE LOJA M.D.   e-Signature: Dr. Warren Borja, Medical Director Degree  MD        ORIGINAL-TREATING PHYSICIAN OR CHIROPRACTOR    PAGE 2-INSURER/TPA    PAGE 3-EMPLOYER    PAGE 4-EMPLOYEE             Form C-4 (rev10/07)              BRIEF DESCRIPTION OF RIGHTS AND BENEFITS  (Pursuant to [place] 616C.050)    Notice of Injury or Occupational Disease (Incident Report Form C-1): If an injury or occupational disease (OD) arises out of and in the  course of employment, you must provide written notice to your employer as soon as practicable, but no later than 7 days after the accident or  OD. Your employer shall maintain a sufficient supply of the required forms.    Claim for Compensation (Form C-4): If medical treatment is sought, the form C-4 is available at the place of initial treatment. A completed  \"Claim for Compensation\" (Form C-4) must be filed within 90 days after an accident or OD. The treating physician or chiropractor must,  within 3 working days after treatment, complete and mail to the employer, the employer's insurer and third-party , the Claim for  Compensation.    Medical Treatment: If you require medical treatment for your on-the-job injury or OD, you may be required to select a physician or  chiropractor from a list provided by your workers’ compensation insurer, if it has contracted with an Organization for Managed Care (MCO) or  Preferred Provider Organization (PPO) or providers of health care. If your employer has not entered into a contract with an MCO or PPO, you  may " select a physician or chiropractor from the Panel of Physicians and Chiropractors. Any medical costs related to your industrial injury or  OD will be paid by your insurer.    Temporary Total Disability (TTD): If your doctor has certified that you are unable to work for a period of at least 5 consecutive days, or 5  cumulative days in a 20-day period, or places restrictions on you that your employer does not accommodate, you may be entitled to TTD  compensation.    Temporary Partial Disability (TPD): If the wage you receive upon reemployment is less than the compensation for TTD to which you are  entitled, the insurer may be required to pay you TPD compensation to make up the difference. TPD can only be paid for a maximum of 24  months.    Permanent Partial Disability (PPD): When your medical condition is stable and there is an indication of a PPD as a result of your injury or  OD, within 30 days, your insurer must arrange for an evaluation by a rating physician or chiropractor to determine the degree of your PPD. The  amount of your PPD award depends on the date of injury, the results of the PPD evaluation and your age and wage.    Permanent Total Disability (PTD): If you are medically certified by a treating physician or chiropractor as permanently and totally disabled  and have been granted a PTD status by your insurer, you are entitled to receive monthly benefits not to exceed 66 2/3% of your average  monthly wage. The amount of your PTD payments is subject to reduction if you previously received a PPD award.    Vocational Rehabilitation Services: You may be eligible for vocational rehabilitation services if you are unable to return to the job due to a  permanent physical impairment or permanent restrictions as a result of your injury or occupational disease.    Transportation and Per Ketan Reimbursement: You may be eligible for travel expenses and per ketan associated with medical treatment.    Reopening: You may  be able to reopen your claim if your condition worsens after claim closure.    Appeal Process: If you disagree with a written determination issued by the insurer or the insurer does not respond to your request, you may  appeal to the Department of Administration, , by following the instructions contained in your determination letter. You must  appeal the determination within 70 days from the date of the determination letter at [address], [address], [place], [place]  [address], or [address], [place], [place] [address]. If you disagree with the  decision, you may appeal to the  Department of Administration, . You must file your appeal within 30 days from the date of the  decision  letter at [address], [address], [place], [place] [address], or [address], [place], [place] [address]. If you  disagree with a decision of an , you may file a petition for judicial review with the District Court. You must do so within 30  days of the Appeal Officer’s decision. You may be represented by an  at your own expense or you may contact the [facility] for possible  representation.    [place]  for Injured Workers (NAIW): If you disagree with a  decision, you may request that NAIW represent you  without charge at an  Hearing. For information regarding denial of benefits, you may contact the [facility] at: [address], [place] [address], [phone], or [address] [facility] [address] [facility] [address], [place] [address], [phone]    To File a Complaint with the Division: If you wish to file a complaint with the  of the Division of Industrial Relations (DIR),  please contact the Workers’ Compensation Section, [address], [address], [place], [place] [address], telephone [phone]) [phone], or  [address], [place] [address], telephone [phone].    For assistance with Workers’ Compensation Issues: you may contact the Office of the Governor Consumer Health Assistance, [address] [place] [address], [address], [place], [place] [address], Toll Free 1-914.450.2921, Web site: [email or web address].[facility].[place]., E-mail  [email or web address].[place].                                                                                                                                                                                                                                   __________________________________________________________________                                                                   _________________                Employee Name / Signature                                                                                                                                                       Date                                                                                                                                                                                                     D-2 (rev. 10/07)

## 2017-10-09 ENCOUNTER — NON-PROVIDER VISIT (OUTPATIENT)
Dept: OCCUPATIONAL MEDICINE | Facility: CLINIC | Age: 22
End: 2017-10-09
Payer: COMMERCIAL

## 2017-10-09 PROCEDURE — 8911 PR MRO FEE: Performed by: INTERNAL MEDICINE

## 2017-10-25 ENCOUNTER — OCCUPATIONAL MEDICINE (OUTPATIENT)
Dept: OCCUPATIONAL MEDICINE | Facility: CLINIC | Age: 22
End: 2017-10-25
Payer: COMMERCIAL

## 2017-10-25 VITALS
TEMPERATURE: 97.6 F | DIASTOLIC BLOOD PRESSURE: 68 MMHG | RESPIRATION RATE: 14 BRPM | BODY MASS INDEX: 25.92 KG/M2 | HEART RATE: 67 BPM | SYSTOLIC BLOOD PRESSURE: 124 MMHG | HEIGHT: 69 IN | OXYGEN SATURATION: 97 % | WEIGHT: 175 LBS

## 2017-10-25 DIAGNOSIS — S96.911D STRAIN OF RIGHT ANKLE, SUBSEQUENT ENCOUNTER: ICD-10-CM

## 2017-10-25 PROCEDURE — 99202 OFFICE O/P NEW SF 15 MIN: CPT | Performed by: PREVENTIVE MEDICINE

## 2017-10-25 ASSESSMENT — ENCOUNTER SYMPTOMS: MUSCULOSKELETAL NEGATIVE: 1

## 2017-10-25 ASSESSMENT — PAIN SCALES - GENERAL: PAINLEVEL: 1=MINIMAL PAIN

## 2017-10-25 NOTE — LETTER
[address],   [address] [place] [address]  Phone:  [phone] - Fax:  [phone]   Occupational Health [facility] Progress Report and Disability Certification  Date of Service: 10/25/2017   No Show:  No  Date / Time of Next Visit:  Release from care   Claim Information   Patient Name: Jimmy Dyer  Claim Number:     Employer: [employer]  Date of Injury: 9/28/2017     Insurer / TPA: [employer]  ID / SSN:     Occupation:    Diagnosis: The encounter diagnosis was Strain of right ankle, subsequent encounter.    Medical Information   Related to Industrial Injury? Yes    Subjective Complaints:  DOI 9/28/2017. JOLLY-> Rt foot got twisted between boulders. X-ray was negative for fracture. Pt denies any weakness or numbness. She is back working full duty. She also is back running 2 miles per day without any limitation. She is taking Ibuprofen prn.    Objective Findings: Full ROM of right LUE.   - ecchymosis, - edema  Normal gait   Pre-Existing Condition(s):     Assessment:   Initial Visit    Status: Discharged /  MMI  Permanent Disability:No    Plan:      Diagnostics:      Comments:  Release of care to full duty    Disability Information   Status: Released to Full Duty    From:  10/25/2017  Through:   Restrictions are:     Physical Restrictions   Sitting:    Standing:    Stooping:    Bending:      Squatting:    Walking:    Climbing:    Pushing:      Pulling:    Other:    Reaching Above Shoulder (L):   Reaching Above Shoulder (R):       Reaching Below Shoulder (L):    Reaching Below Shoulder (R):      Not to exceed Weight Limits   Carrying(hrs):   Weight Limit(lb):   Lifting(hrs):   Weight  Limit(lb):     Comments:      Repetitive Actions   Hands: i.e. Fine Manipulations from Grasping:     Feet: i.e. Operating Foot Controls:     Driving / Operate Machinery:     Physician Name: Eduardo Webb D.O. Physician  Signature: MACARENA Tovar e-Signature: Dr. Warren Borja, Medical Director   Clinic Name / Location: [address],   [address]  [place] [address] Clinic Phone Number: Dept: [phone]   Appointment Time: 9:30 Am Visit Start Time: 9:22 AM   Check-In Time:  9:19 Am Visit Discharge Time: 9:52 AM   Original-Treating Physician or Chiropractor    Page 2-Insurer/TPA    Page 3-Employer    Page 4-Employee

## 2017-11-17 RX ORDER — NORGESTIMATE AND ETHINYL ESTRADIOL 0.25-0.035
1 KIT ORAL DAILY
Qty: 28 TAB | Refills: 6 | Status: SHIPPED | OUTPATIENT
Start: 2017-11-17 | End: 2018-01-03 | Stop reason: SDUPTHER

## 2018-01-03 ENCOUNTER — TELEPHONE (OUTPATIENT)
Dept: MEDICAL GROUP | Facility: MEDICAL CENTER | Age: 23
End: 2018-01-03

## 2018-01-03 DIAGNOSIS — Z78.9 USES ORAL CONTRACEPTIVES AS PRIMARY BIRTH CONTROL METHOD: ICD-10-CM

## 2018-01-03 RX ORDER — NORGESTIMATE AND ETHINYL ESTRADIOL 0.25-0.035
1 KIT ORAL DAILY
Qty: 90 TAB | Refills: 3 | Status: SHIPPED | OUTPATIENT
Start: 2018-01-03 | End: 2018-04-30 | Stop reason: SDUPTHER

## 2018-01-03 NOTE — TELEPHONE ENCOUNTER
Please order birth control for her to be able to  3 months at a time, insurance is limiting her  dates currently and we think this may help.

## 2018-04-18 DIAGNOSIS — M25.512 CHRONIC LEFT SHOULDER PAIN: ICD-10-CM

## 2018-04-18 DIAGNOSIS — G89.29 CHRONIC LEFT SHOULDER PAIN: ICD-10-CM

## 2018-04-18 NOTE — PROGRESS NOTES
1. Caller Name: Laela Wilson                                           Call Back Number: [phone] (home)         Patient approves a detailed voicemail message: N\A    2. SPECIFIC Action To Be Taken: Referral pending, please sign.    3. Diagnosis/Clinical Reason for Request: L shoulder injury - concern for tendon/cartilage problem by chiropractor    4. Specialty & Provider Name/Lab/Imaging Location: Orthopedics    5. Is appointment scheduled for requested order/referral: no    Patient informed they will receive a return phone call from the office ONLY if there are any questions before processing their request. Advised to call back if they haven't received a call from the referral department in 5 days.

## 2018-04-30 ENCOUNTER — OFFICE VISIT (OUTPATIENT)
Dept: MEDICAL GROUP | Facility: MEDICAL CENTER | Age: 23
End: 2018-04-30
Payer: COMMERCIAL

## 2018-04-30 VITALS
BODY MASS INDEX: 25.55 KG/M2 | HEART RATE: 73 BPM | RESPIRATION RATE: 16 BRPM | DIASTOLIC BLOOD PRESSURE: 74 MMHG | WEIGHT: 172.5 LBS | OXYGEN SATURATION: 98 % | HEIGHT: 69 IN | SYSTOLIC BLOOD PRESSURE: 106 MMHG

## 2018-04-30 DIAGNOSIS — Z78.9 USES ORAL CONTRACEPTIVES AS PRIMARY BIRTH CONTROL METHOD: ICD-10-CM

## 2018-04-30 DIAGNOSIS — Z00.00 WELLNESS EXAMINATION: ICD-10-CM

## 2018-04-30 DIAGNOSIS — Z30.41 ENCOUNTER FOR SURVEILLANCE OF CONTRACEPTIVE PILLS: ICD-10-CM

## 2018-04-30 PROCEDURE — 99395 PREV VISIT EST AGE 18-39: CPT | Performed by: PHYSICIAN ASSISTANT

## 2018-04-30 RX ORDER — NORGESTIMATE AND ETHINYL ESTRADIOL 0.25-0.035
1 KIT ORAL DAILY
Qty: 90 TAB | Refills: 3 | Status: SHIPPED | OUTPATIENT
Start: 2018-04-30 | End: 2019-01-31 | Stop reason: SDUPTHER

## 2018-04-30 ASSESSMENT — PATIENT HEALTH QUESTIONNAIRE - PHQ9: CLINICAL INTERPRETATION OF PHQ2 SCORE: 0

## 2018-04-30 NOTE — PROGRESS NOTES
Chief Complaint:     Jimmy Dyer is a 22 y.o. female who presents for annual exam    Pt has GYN provider: yes  Will schedule there for pap/pelvic  Does need refill of BCPs. No lapse in prescription. Takes daily    She  has a past medical history of Anxiety and Depression.  She  has no past surgical history on file.  Current Outpatient Prescriptions   Medication Sig Dispense Refill   • norgestimate-ethinyl estradiol (ORTHO-CYCLEN) 0.25-35 MG-MCG per tablet Take 1 Tab by mouth every day. 90 Tab 3     No current facility-administered medications for this visit.      Social History   Substance Use Topics   • Smoking status: Never Smoker   • Smokeless tobacco: Never Used   • Alcohol use No       Review Of Systems  Denies fever, chills, or sweats, unexplained weight changes  Skin: negative for rash, changing moles, abnormal pigmentation, hair or nail changes.  Eyes: negative for visual blurring, double vision, eye pain, floaters and discharge from eyes  Ears/Nose/Throat: negative for tinnitus, vertigo, oral or dental problem, hoarseness, frequent URI's, sinus trouble, persistent sore throat  Respiratory: negative for persistent cough, hemoptysis, dyspnea, wheezing  Cardiovascular: negative for palpitations, tachycardia, irregular heart beat, chest pain or pressure or peripheral edema.  Breast: Denies breast tenderness, mass,  changes in size or contour, or abnormal cyclic discomfort.  Gastrointestinal: negative for dysphagia or odynophagia, nausea, heartburn or reflux, abdominal pain, hemorrhoids, constipation or diarrhea, black stool or bloody stool  Genitourinary: negative for nocturia, dysuria, frequency, incontinence, abnormal vaginal discharge  Musculoskeletal: negative for joint swelling and muscle pain/ soreness  Neurologic: negative for new or changing headaches, new weakness tremor  Psychiatric: negative for mood or sleep disturbance, anxiety, depression  Hematologic/Lymphatic/Immunologic: negative for pallor,  "unusual bruising  Endocrine: negative for temperature intolerance, polydipsia, polyuria.       PHYSICAL EXAMINATION:  Blood pressure 106/74, pulse 73, resp. rate 16, height 1.753 m (5' 9\"), weight 78.2 kg (172 lb 8 oz), last menstrual period 04/23/2018, SpO2 98 %.  Body mass index is 25.47 kg/m².  Wt Readings from Last 4 Encounters:   04/30/18 78.2 kg (172 lb 8 oz)   10/25/17 79.4 kg (175 lb)   10/01/17 79.8 kg (176 lb)   09/11/17 82.6 kg (182 lb)       Constitutional: Alert, no distress.  Skin: Warm, dry, good turgor, no rashes or suspicious lesions in visible areas.  Eye: pupils equal, round and reactive to light, conjunctivae clear, lids normal.  ENMT: Lips without lesions, good dentition, oropharynx clear. Pinnae skin normal with no lesions. TM pearly gray with normal light reflex.   Neck: supple, no masses. No anterior or supraclavicular adenopathy. No carotid bruits no thyromegaly.  Respiratory: Unlabored respiratory effort, lungs clear to auscultation, no wheezes, no ronchi.  Cardiovascular: Normal S1, S2, no murmur, no peripheral edema.  Abdomen: no CVAT abdomen Soft, non-tender, no masses, no hepatosplenomegaly.  Psych: Alert and oriented x3, normal affect and mood.  Musc/Skel: 5/5 strength and normal motion UE and LE proximal and distal.        ASSESSMENT/PLAN:  1. Wellness examination     2. Encounter for surveillance of contraceptive pills  norgestimate-ethinyl estradiol (ORTHO-CYCLEN) 0.25-35 MG-MCG per tablet   3. Uses oral contraceptives as primary birth control method  norgestimate-ethinyl estradiol (ORTHO-CYCLEN) 0.25-35 MG-MCG per tablet     Schedule PAP/pelvic with GYN  f/u yearly and prn  "

## 2018-09-12 ENCOUNTER — PATIENT MESSAGE (OUTPATIENT)
Dept: MEDICAL GROUP | Facility: MEDICAL CENTER | Age: 23
End: 2018-09-12

## 2018-09-12 DIAGNOSIS — J45.20 MILD INTERMITTENT ASTHMA WITHOUT COMPLICATION: ICD-10-CM

## 2018-09-12 RX ORDER — ALBUTEROL SULFATE 90 UG/1
2 AEROSOL, METERED RESPIRATORY (INHALATION) EVERY 6 HOURS PRN
Qty: 8.5 G | Refills: 3 | Status: SHIPPED | OUTPATIENT
Start: 2018-09-12

## 2019-01-16 NOTE — PROGRESS NOTES
"Subjective:      Jimmy Dyer is a 22 y.o. female who presents with Follow-Up (WC DOI 09/28/2017 - Rt Foot - Better - Room 3)      DOI 9/28/2017. JOLLY-> Rt foot got twisted between boulders. X-ray was negative for fracture. Pt denies any weakness or numbness. She is back working full duty. She also is back running 2 miles per day without any limitation. She is taking Ibuprofen prn.      HPI    Review of Systems   Cardiovascular: Negative for chest pain.   Musculoskeletal: Negative.    Skin: Negative for rash.   All other systems reviewed and are negative.    PMH:  has a past medical history of Anxiety and Depression.  MEDS:   Current Outpatient Prescriptions:   •  oxycodone-acetaminophen (PERCOCET) 7.5-325 MG per tablet, Take 1 Tab by mouth every four hours as needed., Disp: 30 Tab, Rfl: 0  •  docusate sodium (COLACE) 100 MG Cap, Take 1 Cap by mouth 2 times a day., Disp: 30 Cap, Rfl: 0  •  norgestimate-ethinyl estradiol (ORTHO-CYCLEN) 0.25-35 MG-MCG per tablet, Take 1 Tab by mouth every day., Disp: 28 Tab, Rfl: 2  •  ondansetron (ZOFRAN ODT) 8 MG TABLET DISPERSIBLE, Take 1 Tab by mouth every 8 hours as needed for Nausea/Vomiting., Disp: 15 Tab, Rfl: 0  •  gabapentin (NEURONTIN) 300 MG Cap, Take 1 Cap by mouth 3 times a day., Disp: 90 Cap, Rfl: 0  ALLERGIES:   Allergies   Allergen Reactions   • Sulfa Drugs Swelling     Inflammation      SURGHX: No past surgical history on file.  SOCHX:  reports that she has never smoked. She has never used smokeless tobacco. She reports that she uses drugs, including Marijuana. She reports that she does not drink alcohol.  FH: Family history was reviewed, no pertinent findings to report         Objective:     /68   Pulse 67   Temp 36.4 °C (97.6 °F)   Resp 14   Ht 1.753 m (5' 9\")   Wt 79.4 kg (175 lb)   SpO2 97%   BMI 25.84 kg/m²      Physical Exam   Constitutional: She appears well-developed and well-nourished.   HENT:   Head: Normocephalic.   Eyes: EOM are normal. Pupils " [address] [facility]  TEL: [phone]  FAX: [phone]    [facility] [address] Patient Status:  Inpatient    1965 MRN [id number]   [facility] 4NW-A Attending Marah Peñaloza [facility] are equal, round, and reactive to light.   Cardiovascular: Normal rate and regular rhythm.    Skin: Skin is warm and dry.       Full ROM of right LUE.   - ecchymosis, - edema  Normal gait       Assessment/Plan:     1. Strain of right ankle, subsequent encounter    Release of care to Full Duty

## 2019-04-12 ENCOUNTER — OFFICE VISIT (OUTPATIENT)
Dept: MEDICAL GROUP | Facility: MEDICAL CENTER | Age: 24
End: 2019-04-12
Payer: COMMERCIAL

## 2019-04-12 VITALS
OXYGEN SATURATION: 98 % | SYSTOLIC BLOOD PRESSURE: 100 MMHG | HEART RATE: 67 BPM | DIASTOLIC BLOOD PRESSURE: 68 MMHG | RESPIRATION RATE: 12 BRPM | BODY MASS INDEX: 24.56 KG/M2 | TEMPERATURE: 98.4 F | WEIGHT: 165.8 LBS | HEIGHT: 69 IN

## 2019-04-12 DIAGNOSIS — Z00.00 WELLNESS EXAMINATION: ICD-10-CM

## 2019-04-12 DIAGNOSIS — Z78.9 USES ORAL CONTRACEPTIVES AS PRIMARY BIRTH CONTROL METHOD: ICD-10-CM

## 2019-04-12 DIAGNOSIS — Z30.41 ENCOUNTER FOR SURVEILLANCE OF CONTRACEPTIVE PILLS: ICD-10-CM

## 2019-04-12 PROCEDURE — 99395 PREV VISIT EST AGE 18-39: CPT | Performed by: PHYSICIAN ASSISTANT

## 2019-04-12 RX ORDER — NORGESTIMATE AND ETHINYL ESTRADIOL 0.25-0.035
1 KIT ORAL DAILY
Qty: 84 TAB | Refills: 3 | Status: SHIPPED | OUTPATIENT
Start: 2019-04-12

## 2019-04-12 ASSESSMENT — PATIENT HEALTH QUESTIONNAIRE - PHQ9: CLINICAL INTERPRETATION OF PHQ2 SCORE: 0

## 2019-04-12 NOTE — PROGRESS NOTES
"Subjective:   Jimmy Dyer is a 23 y.o. female here today for annual wellness exam. Up to date pap/pelvic with Dr. Teresa, will get records. Non-smoker. No new concerns. 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 Feels happy. Taking BCPs and would like to continue current      Current medicines (including changes today)  Current Outpatient Prescriptions   Medication Sig Dispense Refill   • norgestimate-ethinyl estradiol (ORTHO-CYCLEN) 0.25-35 MG-MCG per tablet Take 1 Tab by mouth every day. 84 Tab 3   • albuterol 108 (90 Base) MCG/ACT Aero Soln inhalation aerosol Inhale 2 Puffs by mouth every 6 hours as needed for Shortness of Breath. 8.5 g 3     No current facility-administered medications for this visit.      She  has a past medical history of Anxiety and Depression.    ROS   No fever/chills. No weight change. No headache/dizziness. No focal weakness. No sore throat, nasal congestion, ear pain. No chest pain, no shortness of breath, difficulty breathing. No n/v/d/c or abdominal pain. No urinary complaint. No rash or skin lesion. No joint pain or swelling.       Objective:     /68 (BP Location: Left arm, Patient Position: Sitting, BP Cuff Size: Adult)   Pulse 67   Temp 36.9 °C (98.4 °F) (Temporal)   Resp 12   Ht 1.753 m (5' 9\")   Wt 75.2 kg (165 lb 12.8 oz)   SpO2 98%  Body mass index is 24.48 kg/m².   Physical Exam:  Constitutional: WDWN, NAD  Skin: Warm, dry, good turgor, no rashes in visible areas.  Eye: Equal, round and reactive, conjunctiva clear, lids normal.  ENMT: Lips without lesions, good dentition, oropharynx clear.  Neck: Trachea midline, no masses, no thyromegaly. No cervical or supraclavicular lymphadenopathy  Respiratory: Unlabored respiratory effort, lungs clear to auscultation, no wheezes, no ronchi.  Cardiovascular: Normal S1, S2, no murmur, no leg edema.  Abdomen: Soft, non-tender, no masses, no hepatosplenomegaly.  Psych: Alert and oriented x3, normal affect and mood.      Assessment and " Plan:   The following treatment plan was discussed    1. Wellness examination      2. Encounter for surveillance of contraceptive pills    - norgestimate-ethinyl estradiol (ORTHO-CYCLEN) 0.25-35 MG-MCG per tablet; Take 1 Tab by mouth every day.  Dispense: 84 Tab; Refill: 3    3. Uses oral contraceptives as primary birth control method    - norgestimate-ethinyl estradiol (ORTHO-CYCLEN) 0.25-35 MG-MCG per tablet; Take 1 Tab by mouth every day.  Dispense: 84 Tab; Refill: 3      Followup: Return in about 1 year (around 4/12/2020).

## 2019-04-12 NOTE — LETTER
Formerly [facility], [facility]  She Wilcox P.A.-C.  [address]  [place] [address]  Fax: [phone]   Authorization for Release/Disclosure of   Protected Health Information   Name: DODIE WILCOX : 1995 SSN: [id number]   Address: [address] [address] Phone:    [phone] (home)    I authorize the entity listed below to release/disclose the PHI below to:   Formerly [facility], [facility]/She Wilcox P.A.-C. and She Wilcox P.A.-C.   Provider or Entity Name:  ONEIL HOWARD   Address   City, State, Zip   Phone:      Fax:     Reason for request: continuity of care   Information to be released:    [  ] LAST COLONOSCOPY,  including any PATH REPORT and follow-up  [  ] LAST FIT/COLOGUARD RESULT [  ] LAST DEXA  [  ] LAST MAMMOGRAM  [  ] LAST PAP  [  ] LAST LABS [  ] RETINA EXAM REPORT  [  ] IMMUNIZATION RECORDS  [  ] Release all info      [  ] Check here and initial the line next to each item to release ALL health information INCLUDING  _____ Care and treatment for drug and / or alcohol abuse  _____ HIV testing, infection status, or AIDS  _____ Genetic Testing    DATES OF SERVICE OR TIME PERIOD TO BE DISCLOSED: _____________  I understand and acknowledge that:  * This Authorization may be revoked at any time by you in writing, except if your health information has already been used or disclosed.  * Your health information that will be used or disclosed as a result of you signing this authorization could be re-disclosed by the recipient. If this occurs, your re-disclosed health information may no longer be protected by State or Federal laws.  * You may refuse to sign this Authorization. Your refusal will not affect your ability to obtain treatment.  * This Authorization becomes effective upon signing and will  on (date) __________.      If no date is indicated, this Authorization will  one (1) year from the signature date.    Name: Dodie Wilcox    Signature:   Date:     2019            PLEASE FAX REQUESTED RECORDS BACK TO: [phone]